# Patient Record
Sex: MALE | Race: WHITE | NOT HISPANIC OR LATINO | Employment: FULL TIME | URBAN - METROPOLITAN AREA
[De-identification: names, ages, dates, MRNs, and addresses within clinical notes are randomized per-mention and may not be internally consistent; named-entity substitution may affect disease eponyms.]

---

## 2022-11-09 ENCOUNTER — OFFICE VISIT (OUTPATIENT)
Dept: FAMILY MEDICINE CLINIC | Facility: CLINIC | Age: 27
End: 2022-11-09

## 2022-11-09 VITALS
SYSTOLIC BLOOD PRESSURE: 124 MMHG | OXYGEN SATURATION: 96 % | WEIGHT: 159.8 LBS | HEIGHT: 64 IN | SYSTOLIC BLOOD PRESSURE: 124 MMHG | TEMPERATURE: 98 F | OXYGEN SATURATION: 96 % | BODY MASS INDEX: 27.28 KG/M2 | HEIGHT: 64 IN | HEART RATE: 77 BPM | DIASTOLIC BLOOD PRESSURE: 80 MMHG | BODY MASS INDEX: 27.28 KG/M2 | HEART RATE: 77 BPM | WEIGHT: 159.8 LBS | TEMPERATURE: 98 F | DIASTOLIC BLOOD PRESSURE: 80 MMHG

## 2022-11-09 DIAGNOSIS — R20.2 NUMBNESS AND TINGLING OF FOOT: Primary | ICD-10-CM

## 2022-11-09 DIAGNOSIS — R20.0 NUMBNESS AND TINGLING OF FOOT: Primary | ICD-10-CM

## 2022-11-09 DIAGNOSIS — Z23 NEED FOR VACCINATION: ICD-10-CM

## 2022-11-09 RX ORDER — LEVOCETIRIZINE DIHYDROCHLORIDE 5 MG/1
TABLET, FILM COATED ORAL
COMMUNITY
Start: 2017-11-04

## 2022-11-09 RX ORDER — DIPHENOXYLATE HYDROCHLORIDE AND ATROPINE SULFATE 2.5; .025 MG/1; MG/1
1 TABLET ORAL DAILY
COMMUNITY

## 2022-11-09 RX ORDER — MONTELUKAST SODIUM 10 MG/1
TABLET ORAL
COMMUNITY
Start: 2011-11-04

## 2022-11-09 NOTE — PROGRESS NOTES
[facility]    ASSESSMENT AND PLAN     1  Numbness and tingling of foot  Assessment & Plan:  Symptoms reviewed  Occurs with right foot/leg only  Sensation intact-monofilament test   Good pulses and cap refill  Full, intact range of motion  Gait intact and steady  Uncertain etiology of patient's complaint  Would recommend referral to Podiatry today for further evaluation and testing if indicated  Orders:  -     Ambulatory Referral to Podiatry; Future    2  Need for vaccination  -     influenza vaccine, quadrivalent, 0 5 mL, preservative-free, for adult and pediatric patients 6 mos+ ([place], [address], [address], [address])         SUBJECTIVE       Patient ID: Areta Landau is a 27 y o  male  Chief Complaint   Patient presents with   • Establish Care     Numbness in the right leg- 2 to 3 months now when running       HISTORY OF PRESENT ILLNESS    Presents as a new patient today, but he is not establishing care  Reports he is moving to [place] in the next week  Plans to establish with a PCP once he relocates  Complains today of chronic numbness and tingling in his right lower leg into his foot  He notes this after walking or running for a period of time - usually after 10 minutes of continuous movements  Starts about mid posterior calf and extending to the entire plantar surface of foot  Usually resolves about 5 mintues after he stops the exercise  Does not note any skin color changes  Does not feel cold  Ambulates fine  Does not loose mobility  Do not feel any foot drop           The following portions of the patient's history were reviewed and updated as appropriate: allergies, current medications, past family history, past medical history, past social history, past surgical history, and problem list     REVIEW OF SYSTEMS  Review of Systems   Musculoskeletal:        Foot and leg pain as in HPI       OBJECTIVE      VITAL SIGNS  /80 (BP Location: Left arm, Patient Position: Sitting, Cuff Size: Standard)   Pulse 77   Temp 98 °F (36 7 °C) (Temporal)   Ht 5' 4" (1 626 m)   Wt 72 5 kg (159 lb 12 8 oz)   SpO2 96%   BMI 27 43 kg/m²     CURRENT MEDICATIONS    Current Outpatient Medications:   •  Cholecalciferol (VITAMIN D3 PO), Take by mouth, Disp: , Rfl:   •  levocetirizine (XYZAL) 5 MG tablet, , Disp: , Rfl:   •  montelukast (SINGULAIR) 10 mg tablet, , Disp: , Rfl:   •  multivitamin (THERAGRAN) TABS, Take 1 tablet by mouth daily, Disp: , Rfl:       PHYSICAL EXAMINATION   Physical Exam  Vitals and nursing note reviewed  Constitutional:       General: He is not in acute distress  Appearance: Normal appearance  He is not ill-appearing  HENT:      Head: Normocephalic  Cardiovascular:      Pulses:           Dorsalis pedis pulses are 2+ on the right side  Posterior tibial pulses are 2+ on the right side  Pulmonary:      Effort: Pulmonary effort is normal  No respiratory distress  Musculoskeletal:      Right lower leg: Normal       Right ankle: Normal  No swelling  No tenderness  Normal pulse  Right Achilles Tendon: Normal       Right foot: Normal range of motion  No deformity, bunion, Charcot foot, foot drop or prominent metatarsal heads  Feet:      Right foot:      Protective Sensation: 10 sites tested  10 sites sensed  Skin integrity: Skin integrity normal       Toenail Condition: Right toenails are normal    Neurological:      Mental Status: He is alert and oriented to person, place, and time     Psychiatric:         Attention and Perception: Attention normal          Mood and Affect: Mood normal          Behavior: Behavior normal

## 2022-11-10 PROBLEM — R20.0 NUMBNESS AND TINGLING OF FOOT: Status: ACTIVE | Noted: 2022-11-10

## 2022-11-10 PROBLEM — R20.2 NUMBNESS AND TINGLING OF FOOT: Status: ACTIVE | Noted: 2022-11-10

## 2022-11-10 NOTE — ASSESSMENT & PLAN NOTE
Symptoms reviewed  Occurs with right foot/leg only  Sensation intact-monofilament test   Good pulses and cap refill  Full, intact range of motion  Gait intact and steady  Uncertain etiology of patient's complaint  Would recommend referral to Podiatry today for further evaluation and testing if indicated

## 2025-07-08 ENCOUNTER — OFFICE VISIT (OUTPATIENT)
Dept: URGENT CARE | Age: 30
End: 2025-07-08
Payer: COMMERCIAL

## 2025-07-08 VITALS
OXYGEN SATURATION: 99 % | HEART RATE: 90 BPM | SYSTOLIC BLOOD PRESSURE: 120 MMHG | RESPIRATION RATE: 18 BRPM | DIASTOLIC BLOOD PRESSURE: 76 MMHG | TEMPERATURE: 99.6 F

## 2025-07-08 DIAGNOSIS — R51.9 ACUTE NONINTRACTABLE HEADACHE, UNSPECIFIED HEADACHE TYPE: ICD-10-CM

## 2025-07-08 DIAGNOSIS — J02.9 SORE THROAT: Primary | ICD-10-CM

## 2025-07-08 LAB — S PYO AG THROAT QL: NEGATIVE

## 2025-07-08 PROCEDURE — 96372 THER/PROPH/DIAG INJ SC/IM: CPT

## 2025-07-08 PROCEDURE — G0382 LEV 3 HOSP TYPE B ED VISIT: HCPCS

## 2025-07-08 PROCEDURE — 87880 STREP A ASSAY W/OPTIC: CPT

## 2025-07-08 RX ORDER — KETOROLAC TROMETHAMINE 30 MG/ML
30 INJECTION, SOLUTION INTRAMUSCULAR; INTRAVENOUS ONCE
Status: COMPLETED | OUTPATIENT
Start: 2025-07-08 | End: 2025-07-08

## 2025-07-08 RX ADMIN — KETOROLAC TROMETHAMINE 30 MG: 30 INJECTION, SOLUTION INTRAMUSCULAR; INTRAVENOUS at 12:13

## 2025-07-08 NOTE — PROGRESS NOTES
"[facility]  Name: Wanye Robert      : 1995      MRN: [id number]  Encounter Provider: SHIN Stauffer  Encounter Date: 2025   Encounter department: [facility] [place]  :  Rapid strep performed in office found to be negative.  Single dose of Toradol given in office. Please avoid NSAIDs (Ibuprofen, Aleve, Advil, etc) for the next 8 hours, may take Tylenol as needed for pain.   May alternate Tylenol/ibuprofen as needed for fever.  May use Cepacol lozenges, Chloraseptic throat spray, warm salt water gargles and hot tea with honey as needed for sore throat.  Follow up with primary care provider if symptoms do not resolve within 1-2 weeks.   Assessment & Plan  Sore throat    Orders:    POCT rapid ANTIGEN strepA    Acute nonintractable headache, unspecified headache type    Orders:    ketorolac (TORADOL) injection 30 mg        Patient Instructions  Patient Education     Headache, Adult ED   General Information   You came to the Emergency Department (ED) today for a headache. The doctors feel it is unlikely that something serious is causing your headache and it is safe for you to recover at home.  You may be waiting on some test results. If so, the staff will contact you if there are concerning results.  What care is needed at home?   Call your regular doctor to let them know you were in the ED. Make a follow-up appointment if you are told to.  You can take drugs like acetaminophen, ibuprofen, or naproxen for pain as instructed, but use of these pain medicines should be limited. If you need to take pain medicines every day for headaches, call your doctor.  If possible, lie down in a quiet, dark room.  Make sure you eat at regular times. Do not skip meals. Drink plenty of fluids. Be sure you are getting enough sleep.  If you have frequent headaches that interfere with your activities, you can keep a \"headache diary.\" This might help to see if there is a pattern to your " headaches. Make notes about:  Where your pain is on your head or neck.  When you have the pain and how long it lasts.  How your pain feels. Is it dull, sharp, burning, stabbing, or cramping?  What causes your pain?  What makes your pain better or worse?  When do I need to get emergency help?   Call for an ambulance right away if:   You have a seizure.  You have signs of stroke like sudden:  Numbness or weakness of the face, arm. or leg, especially on one side of the body.  Confusion, trouble speaking, or understanding.  Trouble seeing in one or both eyes.  Trouble walking, dizziness, loss of balance, or coordination.  Severe headache with no known cause.  You feel extremely weak, confused, or lethargic, or you pass out.  You have a headache along with neck pain, neck stiffness, fever, or chills.  You have a headache along with a new skin rash.  You have significant nausea or vomiting with your headache.  When do I need to call the doctor?   The headache lasts more than a few days or the pain gets worse or comes more often.  You have new or worsening symptoms.  Last Reviewed Date   2020-06-16  Consumer Information Use and Disclaimer   This generalized information is a limited summary of diagnosis, treatment, and/or medication information. It is not meant to be comprehensive and should be used as a tool to help the user understand and/or assess potential diagnostic and treatment options. It does NOT include all information about conditions, treatments, medications, side effects, or risks that may apply to a specific patient. It is not intended to be medical advice or a substitute for the medical advice, diagnosis, or treatment of a health care provider based on the health care provider's examination and assessment of a patient’s specific and unique circumstances. Patients must speak with a health care provider for complete information about their health, medical questions, and treatment options, including any risks or  benefits regarding use of medications. This information does not endorse any treatments or medications as safe, effective, or approved for treating a specific patient. [employer] and its affiliates disclaim any warranty or liability relating to this information or the use thereof. The use of this information is governed by the Terms of Use, available at https://www.[employer].judo/en/know/clinical-effectiveness-terms   Copyright   Follow up with PCP in 3-5 days.  Proceed to  ER if symptoms worsen.    If tests are performed, our office will contact you with results only if changes need to made to the care plan discussed with you at the visit. You can review your full results on [facility] MyChart.    Chief Complaint:   Chief Complaint   Patient presents with    Back Pain    Headache    Cough     Patient states that he had a virus 1 month ago that lingered with dry cough for 3 weeks. This morning he woke up with lower back pain, HA, and blurry vision unable to focus. He notes sore throat comes and goes with a productive cough.      History of Present Illness   Patient is a 29 year old male with no significant PMH, who presents for evaluation of sore throat, headache and low back pain. He reports that about one month ago he was ill with a presumably viral syndrome for approximately 2 days. Symptoms improved, and he was dealing with a dry cough which began to improve about one week ago. Yesterday, however, he developed an occipital headache and sore throat, and this morning woke with low back pain. He has taken vitamin C for symptoms without relief. He denies fever, n/v/d, chills, changes in bowel/bladder function, numbness, tingling, chest pain.      Back Pain  Associated symptoms include headaches. Pertinent negatives include no chest pain, fever or numbness.   Headache  Cough  Associated symptoms include headaches and a sore throat. Pertinent negatives include no chest pain, ear pain, eye redness, fever,  myalgias, postnasal drip, rash, rhinorrhea, shortness of breath or wheezing. There is no history of environmental allergies.         Review of Systems   Constitutional:  Negative for fatigue and fever.   HENT:  Positive for sore throat. Negative for congestion, ear discharge, ear pain, postnasal drip, rhinorrhea, sinus pressure, sinus pain and sneezing.    Eyes: Negative.  Negative for pain, discharge, redness and itching.   Respiratory:  Negative for apnea, cough, choking, chest tightness, shortness of breath, wheezing and stridor.    Cardiovascular: Negative.  Negative for chest pain and palpitations.   Gastrointestinal: Negative.  Negative for diarrhea, nausea and vomiting.   Endocrine: Negative.  Negative for polydipsia, polyphagia and polyuria.   Genitourinary: Negative.  Negative for decreased urine volume and flank pain.   Musculoskeletal:  Positive for back pain. Negative for arthralgias, myalgias, neck pain and neck stiffness.   Skin: Negative.  Negative for color change and rash.   Allergic/Immunologic: Negative.  Negative for environmental allergies.   Neurological:  Positive for headaches. Negative for dizziness, facial asymmetry, light-headedness and numbness.   Hematological: Negative.  Negative for adenopathy.   Psychiatric/Behavioral: Negative.       Past Medical History   Past Medical History[1]  Past Surgical History[2]  Family History[3]  he reports that he has never smoked. He has never used smokeless tobacco. He reports current alcohol use.  Current Outpatient Medications   Medication Instructions    Cholecalciferol (VITAMIN D3 PO) Take by mouth    levocetirizine (XYZAL) 5 MG tablet No dose, route, or frequency recorded.    montelukast (SINGULAIR) 10 mg tablet No dose, route, or frequency recorded.    multivitamin (THERAGRAN) TABS 1 tablet, Daily   Allergies[4]     Objective   /76   Pulse 90   Temp 99.6 °F (37.6 °C)   Resp 18   SpO2 99%      Physical Exam  Vitals and nursing note  reviewed.   Constitutional:       General: He is not in acute distress.     Appearance: He is well-developed. He is not ill-appearing, toxic-appearing or diaphoretic.      Interventions: He is not intubated.  HENT:      Head: Normocephalic and atraumatic.      Right Ear: Tympanic membrane, ear canal and external ear normal. There is no impacted cerumen.      Left Ear: Tympanic membrane, ear canal and external ear normal. There is no impacted cerumen.      Mouth/Throat:      Pharynx: Oropharynx is clear. Uvula midline. No pharyngeal swelling, oropharyngeal exudate, posterior oropharyngeal erythema, uvula swelling or postnasal drip.      Tonsils: No tonsillar exudate or tonsillar abscesses. 1+ on the right. 1+ on the left.     Eyes:      Conjunctiva/sclera: Conjunctivae normal.     Neck:      Thyroid: No thyroid mass, thyromegaly or thyroid tenderness.     Cardiovascular:      Rate and Rhythm: Normal rate and regular rhythm.      Pulses: Normal pulses.      Heart sounds: Normal heart sounds, S1 normal and S2 normal. Heart sounds not distant. No murmur heard.     No friction rub. No gallop.   Pulmonary:      Effort: Pulmonary effort is normal. No tachypnea, bradypnea, accessory muscle usage, prolonged expiration, respiratory distress or retractions. He is not intubated.      Breath sounds: Normal breath sounds. No stridor, decreased air movement or transmitted upper airway sounds. No decreased breath sounds, wheezing, rhonchi or rales.   Abdominal:      Palpations: Abdomen is soft.      Tenderness: There is no abdominal tenderness.     Musculoskeletal:         General: No swelling.      Cervical back: Normal range of motion and neck supple. No edema, erythema, signs of trauma, rigidity, torticollis or crepitus. Pain with movement and muscular tenderness present. No spinous process tenderness. Normal range of motion.      Lumbar back: No swelling, edema, deformity, signs of trauma, lacerations, spasms, tenderness or  "bony tenderness. Normal range of motion. No scoliosis.   Lymphadenopathy:      Cervical: No cervical adenopathy.      Right cervical: No superficial cervical adenopathy.     Left cervical: No superficial cervical adenopathy.     Skin:     General: Skin is warm and dry.      Capillary Refill: Capillary refill takes less than 2 seconds.     Neurological:      Mental Status: He is alert.     Psychiatric:         Mood and Affect: Mood normal.         Portions of the record may have been created with voice recognition software.  Occasional wrong word or \"sound a like\" substitutions may have occurred due to the inherent limitations of voice recognition software.  Read the chart carefully and recognize, using context, where substitutions have occurred.       [1]   Past Medical History:  Diagnosis Date    Allergic    [2]   Past Surgical History:  Procedure Laterality Date    HERNIA REPAIR  1998    WISDOM TOOTH EXTRACTION     [3]   Family History  Problem Relation Name Age of Onset    No Known Problems Mother      No Known Problems Father     [4]   Allergies  Allergen Reactions    Beef Allergy - Food Allergy Anaphylaxis, Hives, Itching, Rash, Shortness Of Breath and Swelling    Egg Phospholipids - Food Allergy Anaphylaxis, Hives, Itching, Rash, Shortness Of Breath and Swelling    Milk Protein - Food Allergy Anaphylaxis, Dizziness, Fever, Headache, Hives, Itching, Rash, Shortness Of Breath and Vomiting    Nuts - Food Allergy Anaphylaxis, Hives, Itching, Rash, Shortness Of Breath and Swelling    Shellfish Allergy - Food Allergy Anaphylaxis, Hives, Itching, Rash, Shortness Of Breath and Swelling     "

## 2025-07-08 NOTE — LETTER
July 8, 2025     Patient: Wayne Robert   YOB: 1995   Date of Visit: 7/8/2025       To Whom it May Concern:    Wayne Robert was seen in my clinic on 7/8/2025. He may return on 07/10/2025.     If you have any questions or concerns, please don't hesitate to call.         Sincerely,          SHIN Stauffer        CC: No Recipients

## 2025-07-08 NOTE — PATIENT INSTRUCTIONS
Rapid strep performed in office found to be negative.  Single dose of Toradol given in office. Please avoid NSAIDs (Ibuprofen, Aleve, Advil, etc) for the next 8 hours, may take Tylenol as needed for pain.   May alternate Tylenol/ibuprofen as needed for fever.  May use Cepacol lozenges, Chloraseptic throat spray, warm salt water gargles and hot tea with honey as needed for sore throat.  Follow up with primary care provider if symptoms do not resolve within 1-2 weeks.